# Patient Record
Sex: MALE | Race: WHITE | NOT HISPANIC OR LATINO | Employment: FULL TIME | URBAN - METROPOLITAN AREA
[De-identification: names, ages, dates, MRNs, and addresses within clinical notes are randomized per-mention and may not be internally consistent; named-entity substitution may affect disease eponyms.]

---

## 2022-10-13 ENCOUNTER — LAB (OUTPATIENT)
Dept: LAB | Facility: HOSPITAL | Age: 42
End: 2022-10-13

## 2022-10-13 ENCOUNTER — OFFICE VISIT (OUTPATIENT)
Dept: CARDIOLOGY | Facility: CLINIC | Age: 42
End: 2022-10-13

## 2022-10-13 VITALS
OXYGEN SATURATION: 96 % | HEART RATE: 73 BPM | BODY MASS INDEX: 26.98 KG/M2 | HEIGHT: 72 IN | SYSTOLIC BLOOD PRESSURE: 138 MMHG | DIASTOLIC BLOOD PRESSURE: 88 MMHG | WEIGHT: 199.2 LBS

## 2022-10-13 DIAGNOSIS — R07.2 PRECORDIAL PAIN: ICD-10-CM

## 2022-10-13 DIAGNOSIS — Z72.0 TOBACCO USE: ICD-10-CM

## 2022-10-13 DIAGNOSIS — I49.3 PVC'S (PREMATURE VENTRICULAR CONTRACTIONS): ICD-10-CM

## 2022-10-13 DIAGNOSIS — R00.2 PALPITATIONS: Primary | ICD-10-CM

## 2022-10-13 DIAGNOSIS — R00.2 PALPITATIONS: ICD-10-CM

## 2022-10-13 LAB
ALBUMIN SERPL-MCNC: 4.6 G/DL (ref 3.5–5.2)
ALBUMIN/GLOB SERPL: 2.3 G/DL
ALP SERPL-CCNC: 87 U/L (ref 39–117)
ALT SERPL W P-5'-P-CCNC: 15 U/L (ref 1–41)
ANION GAP SERPL CALCULATED.3IONS-SCNC: 8 MMOL/L (ref 5–15)
AST SERPL-CCNC: 21 U/L (ref 1–40)
BASOPHILS # BLD AUTO: 0.05 10*3/MM3 (ref 0–0.2)
BASOPHILS NFR BLD AUTO: 0.7 % (ref 0–1.5)
BILIRUB SERPL-MCNC: 0.2 MG/DL (ref 0–1.2)
BUN SERPL-MCNC: 13 MG/DL (ref 6–20)
BUN/CREAT SERPL: 14.1 (ref 7–25)
CALCIUM SPEC-SCNC: 9.4 MG/DL (ref 8.6–10.5)
CHLORIDE SERPL-SCNC: 103 MMOL/L (ref 98–107)
CHOLEST SERPL-MCNC: 137 MG/DL (ref 0–200)
CO2 SERPL-SCNC: 29 MMOL/L (ref 22–29)
CREAT SERPL-MCNC: 0.92 MG/DL (ref 0.76–1.27)
CRP SERPL-MCNC: <0.02 MG/DL (ref 0.01–0.5)
DEPRECATED RDW RBC AUTO: 37.6 FL (ref 37–54)
EGFRCR SERPLBLD CKD-EPI 2021: 105.8 ML/MIN/1.73
EOSINOPHIL # BLD AUTO: 0.3 10*3/MM3 (ref 0–0.4)
EOSINOPHIL NFR BLD AUTO: 4 % (ref 0.3–6.2)
ERYTHROCYTE [DISTWIDTH] IN BLOOD BY AUTOMATED COUNT: 11.8 % (ref 12.3–15.4)
ERYTHROCYTE [SEDIMENTATION RATE] IN BLOOD: 3 MM/HR (ref 0–15)
FERRITIN SERPL-MCNC: 20.8 NG/ML (ref 30–400)
GLOBULIN UR ELPH-MCNC: 2 GM/DL
GLUCOSE SERPL-MCNC: 93 MG/DL (ref 65–99)
HBA1C MFR BLD: 5.5 % (ref 4.8–5.6)
HCT VFR BLD AUTO: 43.3 % (ref 37.5–51)
HDLC SERPL-MCNC: 45 MG/DL (ref 40–60)
HGB BLD-MCNC: 14.2 G/DL (ref 13–17.7)
IMM GRANULOCYTES # BLD AUTO: 0.02 10*3/MM3 (ref 0–0.05)
IMM GRANULOCYTES NFR BLD AUTO: 0.3 % (ref 0–0.5)
IRON 24H UR-MRATE: 70 MCG/DL (ref 59–158)
IRON SATN MFR SERPL: 14 % (ref 20–50)
LDLC SERPL CALC-MCNC: 81 MG/DL (ref 0–100)
LDLC/HDLC SERPL: 1.83 {RATIO}
LYMPHOCYTES # BLD AUTO: 1.64 10*3/MM3 (ref 0.7–3.1)
LYMPHOCYTES NFR BLD AUTO: 21.7 % (ref 19.6–45.3)
MAGNESIUM SERPL-MCNC: 2.1 MG/DL (ref 1.6–2.6)
MCH RBC QN AUTO: 28.9 PG (ref 26.6–33)
MCHC RBC AUTO-ENTMCNC: 32.8 G/DL (ref 31.5–35.7)
MCV RBC AUTO: 88.2 FL (ref 79–97)
MONOCYTES # BLD AUTO: 0.72 10*3/MM3 (ref 0.1–0.9)
MONOCYTES NFR BLD AUTO: 9.5 % (ref 5–12)
NEUTROPHILS NFR BLD AUTO: 4.82 10*3/MM3 (ref 1.7–7)
NEUTROPHILS NFR BLD AUTO: 63.8 % (ref 42.7–76)
NRBC BLD AUTO-RTO: 0 /100 WBC (ref 0–0.2)
NT-PROBNP SERPL-MCNC: 90.8 PG/ML (ref 0–450)
PLATELET # BLD AUTO: 180 10*3/MM3 (ref 140–450)
PMV BLD AUTO: 10.1 FL (ref 6–12)
POTASSIUM SERPL-SCNC: 4 MMOL/L (ref 3.5–5.2)
PREALB SERPL-MCNC: 24 MG/DL (ref 20–40)
PROT SERPL-MCNC: 6.6 G/DL (ref 6–8.5)
RBC # BLD AUTO: 4.91 10*6/MM3 (ref 4.14–5.8)
SODIUM SERPL-SCNC: 140 MMOL/L (ref 136–145)
TIBC SERPL-MCNC: 492 MCG/DL (ref 298–536)
TRANSFERRIN SERPL-MCNC: 330 MG/DL (ref 200–360)
TRIGL SERPL-MCNC: 49 MG/DL (ref 0–150)
VIT B12 BLD-MCNC: 472 PG/ML (ref 211–946)
VLDLC SERPL-MCNC: 11 MG/DL (ref 5–40)
WBC NRBC COR # BLD: 7.55 10*3/MM3 (ref 3.4–10.8)

## 2022-10-13 PROCEDURE — 36415 COLL VENOUS BLD VENIPUNCTURE: CPT

## 2022-10-13 PROCEDURE — 83880 ASSAY OF NATRIURETIC PEPTIDE: CPT

## 2022-10-13 PROCEDURE — 83036 HEMOGLOBIN GLYCOSYLATED A1C: CPT

## 2022-10-13 PROCEDURE — 80061 LIPID PANEL: CPT

## 2022-10-13 PROCEDURE — 82043 UR ALBUMIN QUANTITATIVE: CPT

## 2022-10-13 PROCEDURE — 83735 ASSAY OF MAGNESIUM: CPT

## 2022-10-13 PROCEDURE — 86141 C-REACTIVE PROTEIN HS: CPT

## 2022-10-13 PROCEDURE — 99204 OFFICE O/P NEW MOD 45 MIN: CPT | Performed by: INTERNAL MEDICINE

## 2022-10-13 PROCEDURE — 82728 ASSAY OF FERRITIN: CPT

## 2022-10-13 PROCEDURE — 83540 ASSAY OF IRON: CPT

## 2022-10-13 PROCEDURE — 93000 ELECTROCARDIOGRAM COMPLETE: CPT | Performed by: INTERNAL MEDICINE

## 2022-10-13 PROCEDURE — 84466 ASSAY OF TRANSFERRIN: CPT

## 2022-10-13 PROCEDURE — 82607 VITAMIN B-12: CPT

## 2022-10-13 PROCEDURE — 80053 COMPREHEN METABOLIC PANEL: CPT

## 2022-10-13 PROCEDURE — 82652 VIT D 1 25-DIHYDROXY: CPT

## 2022-10-13 PROCEDURE — 84134 ASSAY OF PREALBUMIN: CPT

## 2022-10-13 PROCEDURE — 85652 RBC SED RATE AUTOMATED: CPT

## 2022-10-13 PROCEDURE — 85025 COMPLETE CBC W/AUTO DIFF WBC: CPT

## 2022-10-13 NOTE — PROGRESS NOTES
"     New Patient Office Visit      Date: 10/13/2022  Patient Name: Ruy Blanchard  : 1980   MRN: [id number]     Chief Complaint:    Chief Complaint   Patient presents with   • Chest Pain   • Dizziness   • Irregular Heart Beat       History of Present Illness: Ruy Blanchard is a 42 y.o. male who is here today for evaluation of chest pain dizziness and palpitations.  Patient is a schoolteacher and starting having chest pain now while doing some exertion such sharp pain without the deep left side and sometimes 8 happens at night sometime he gets up at night because of this chest pain he gets this feeling more often than before.  His father had premature coronary artery disease and  at the age of 43.  He also sometimes start feeling that his heart rate has just picked up and started going fast.      Problem List        CARDIAC:  a.  Coronary artery disease:  1.  Chest pain 10/22:     d.  Electrical:  1.  PVCs 10/22:         CARDIAC RISK FACTORS:  Smoking  Family history of premature coronary artery disease  Unknown lipids                SURGERIES:  1.  Achilles tendon repair  2.  Dental procedure                Subjective      Review of Systems:   Review of Systems   Constitutional: Positive for fatigue.   Respiratory: Positive for shortness of breath.    Cardiovascular: Positive for chest pain.   Neurological: Positive for headache.       Medications:   No current outpatient medications on file.    Allergies:   No Known Allergies    Objective     Physical Exam:  Vital Signs:   Vitals:    10/13/22 1403   BP: 138/88   BP Location: Right arm   Patient Position: Sitting   Pulse: 73   SpO2: 96%   Weight: 90.4 kg (199 lb 3.2 oz)   Height: 182.9 cm (72\")     Body mass index is 27.02 kg/m².   Constitutional:       General: Not in acute distress.     Appearance: Healthy appearance. Not in distress.   Pulmonary:      Effort: Pulmonary effort is normal.      Breath sounds: Normal breath sounds. No " wheezing. No rhonchi. No rales.   Cardiovascular:      Normal rate. Regular rhythm. Normal S1. Normal S2.      Murmurs: There is no murmur.      No gallop. No click. No rub.   Abdominal:      General: Bowel sounds are normal.      Palpations: Abdomen is soft.      Tenderness: There is no abdominal tenderness.  Extremities:     Pulses     Intact distal pulses.     No Edema      ECG 12 Lead    Date/Time: 10/13/2022 4:18 PM  Performed by: Maynor Chowdhury MD  Authorized by: Maynor Chowdhury MD   Rhythm: sinus rhythm  Ectopy: unifocal PVCs    Clinical impression: normal ECG          Smoking Cessation:   3-10 mintues spent counseling Will try to cut down    Assessment / Plan      Assessment:   Diagnosis Plan   1. Palpitations  CBC & Differential    Comprehensive Metabolic Panel    Holter Monitor - 72 Hour Up To 15 Days      2. Precordial pain  BNP    Hemoglobin A1c    Lipid Panel    Magnesium    High Sensitivity CRP    Sedimentation Rate    CT Cardiac Calcium Score Without Dye    Treadmill Stress Test    Adult Transthoracic Echo Complete W/ Cont if Necessary Per Protocol    Vitamin D 1,25 Dihydroxy    Doppler Ankle Brachial Index Single Level CAR      3. PVC's (premature ventricular contractions)  Ferritin    Iron Profile    Vitamin B12    Prealbumin    MicroAlbumin, Urine, Random - Urine, Clean Catch      4. Tobacco use             Plan:    1.  Patient had a lot of risk factors for coronary artery disease.  Patient major risk factors premature coronary artery disease.  We will do restratification studies for evaluation of his coronary artery disease.    2.  We will do a Holter monitor to further delineate PVCs.          Follow Up:   Return in about 4 weeks (around 11/10/2022).    Maynor Chowdhury MD

## 2022-10-14 LAB — ALBUMIN UR-MCNC: <1.2 MG/DL

## 2022-10-15 LAB — 1,25(OH)2D SERPL-MCNC: 61.6 PG/ML (ref 24.8–81.5)

## 2022-10-17 ENCOUNTER — TELEPHONE (OUTPATIENT)
Dept: CARDIOLOGY | Facility: CLINIC | Age: 42
End: 2022-10-17

## 2022-10-17 NOTE — TELEPHONE ENCOUNTER
"Attempted to call pt with MA recommendations \"All his labs are okay except his iron is low.  He needs to see his primary care to follow-up on that if he does not have a primary care please let me know\"    NO VM AVAILABLE.    "

## 2022-10-19 ENCOUNTER — TELEPHONE (OUTPATIENT)
Dept: CARDIOLOGY | Facility: CLINIC | Age: 42
End: 2022-10-19

## 2022-10-19 NOTE — TELEPHONE ENCOUNTER
"Notified pt's brother Kemal \"Please let him know all his labs are normal except iron studies.  He needs to see his primary care or hematologist\" per MA. Pt's brother verbalized understanding.     "

## 2022-10-20 NOTE — TELEPHONE ENCOUNTER
Pt called and LVM with further questions regarding his heart monitor. I attempted to call pt, no VM available. Spoke with his Brother Corbin to let him know to call us back.

## 2022-11-06 ENCOUNTER — HOSPITAL ENCOUNTER (OUTPATIENT)
Dept: CT IMAGING | Facility: HOSPITAL | Age: 42
Discharge: HOME | End: 2022-11-06
Admitting: INTERNAL MEDICINE

## 2022-11-06 DIAGNOSIS — R07.2 PRECORDIAL PAIN: ICD-10-CM

## 2022-11-06 PROCEDURE — 75571 CT HRT W/O DYE W/CA TEST: CPT

## 2022-11-08 ENCOUNTER — HOSPITAL ENCOUNTER (OUTPATIENT)
Dept: CARDIOLOGY | Facility: HOSPITAL | Age: 42
End: 2022-11-08

## 2022-11-09 ENCOUNTER — TELEPHONE (OUTPATIENT)
Dept: CARDIOLOGY | Facility: CLINIC | Age: 42
End: 2022-11-09

## 2022-11-09 NOTE — TELEPHONE ENCOUNTER
"Pt's cell had no vm available, Notified pt's brother Corbin \"Which please let him know that his calcium score is 0 meaning he does not have any buildup of blockages in his heart\" per MA. Pt's brother verbalized understanding.     "

## 2022-11-10 ENCOUNTER — OFFICE VISIT (OUTPATIENT)
Dept: CARDIOLOGY | Facility: CLINIC | Age: 42
End: 2022-11-10

## 2022-11-10 VITALS
OXYGEN SATURATION: 97 % | BODY MASS INDEX: 26.68 KG/M2 | WEIGHT: 197 LBS | HEART RATE: 71 BPM | SYSTOLIC BLOOD PRESSURE: 116 MMHG | HEIGHT: 72 IN | DIASTOLIC BLOOD PRESSURE: 82 MMHG

## 2022-11-10 DIAGNOSIS — I49.3 PVC'S (PREMATURE VENTRICULAR CONTRACTIONS): ICD-10-CM

## 2022-11-10 DIAGNOSIS — R00.2 PALPITATIONS: Primary | ICD-10-CM

## 2022-11-10 DIAGNOSIS — R07.2 PRECORDIAL PAIN: ICD-10-CM

## 2022-11-10 PROCEDURE — 99213 OFFICE O/P EST LOW 20 MIN: CPT | Performed by: INTERNAL MEDICINE

## 2022-11-10 NOTE — PROGRESS NOTES
"     Follow-up Visit      Date: 11/10/2022  Patient Name: Ruy Blanchard  : 1980   MRN: [id number]     Chief Complaint:    Chief Complaint   Patient presents with   • Palpitations       History of Present Illness: Ruy Blanchard is a 42 y.o. male who is here today for follow-up on his testing.  He still feels that he has palpitations but his 15-day monitor hardly showed an extra beat except with 3 beats of V. tach.  He denies any chest pain at this time his trying to cut down on his smoking also.      Problem List         1. CARDIAC  a. Coronary Artery Disease:   i. Calcium score 0    b. Electrical:   i. Normal Holter with 3 beats run of V. tach      2. CARDIAC RISK FACTORS:  a.        Family History  b.        Tobacco Use    3. NON-CARDIAC:  a. Left message    4. SURGERIES:  a. Achilles tendon repair  b. Dental procedure      Subjective      Review of Systems:   Review of Systems    Medications:   No current outpatient medications on file.    Allergies:   No Known Allergies    Objective     Physical Exam:  Vitals:    11/10/22 1335   BP: 116/82   BP Location: Right arm   Patient Position: Sitting   Pulse: 71   SpO2: 97%   Weight: 89.4 kg (197 lb)   Height: 182.9 cm (72\")     Body mass index is 26.72 kg/m².        Constitutional:       General: Not in acute distress.     Appearance: Healthy appearance. Not in distress.     Pulmonary:      Effort: Pulmonary effort is normal.      Breath sounds: Normal breath sounds. No wheezing. No rhonchi. No rales.     Cardiovascular:      Normal rate. Regular rhythm. Normal S1. Normal S2.      Murmurs: There is no murmur.      No gallop. No click. No rub.     Abdominal:      General: Bowel sounds are normal.      Palpations: Abdomen is soft.      Tenderness: There is no abdominal tenderness.    Extremities:     Pulses     Intact distal pulses.     No Edema     Smoking Cessation:   less than 3 minutes spent counseling Has reduced [place] use    Lab Review: "   Lab Results   Component Value Date    GLUCOSE 93 10/13/2022    BUN 13 10/13/2022    CREATININE 0.92 10/13/2022    BCR 14.1 10/13/2022    K 4.0 10/13/2022    CO2 29.0 10/13/2022    CALCIUM 9.4 10/13/2022    ALBUMIN 4.60 10/13/2022    AST 21 10/13/2022    ALT 15 10/13/2022     Lab Results   Component Value Date    WBC 7.55 10/13/2022    HGB 14.2 10/13/2022    HCT 43.3 10/13/2022    MCV 88.2 10/13/2022     10/13/2022     Lab Results   Component Value Date    CHOL 137 10/13/2022    TRIG 49 10/13/2022    HDL 45 10/13/2022    LDL 81 10/13/2022     No results found for: TSH  Lab Results   Component Value Date    HGBA1C 5.50 10/13/2022           Assessment / Plan      Assessment:   Diagnosis Plan   1. Palpitations        2. Precordial pain        3. PVC's (premature ventricular contractions)             Plan:  1.  Most of the symptoms of the patient in terms of palpitation did not show any run of arrhythmias.  He has 0 calcium score.  I have advised him to continue to taking current medications.    2.  I have advised him to quit smoking and is working on it.    3.  I have advised patient to have a stress test if he continues having any problem.  But his the name of the game is quit smoking.      Follow Up:       Return in about 1 year (around 11/10/2023).    Maynor Chowdhury MD